# Patient Record
Sex: MALE | Race: WHITE | URBAN - METROPOLITAN AREA
[De-identification: names, ages, dates, MRNs, and addresses within clinical notes are randomized per-mention and may not be internally consistent; named-entity substitution may affect disease eponyms.]

---

## 2017-10-10 ENCOUNTER — EMERGENCY (EMERGENCY)
Facility: HOSPITAL | Age: 22
LOS: 0 days | Discharge: HOME | End: 2017-10-10
Admitting: INTERNAL MEDICINE

## 2017-10-10 DIAGNOSIS — Z88.8 ALLERGY STATUS TO OTHER DRUGS, MEDICAMENTS AND BIOLOGICAL SUBSTANCES: ICD-10-CM

## 2017-10-10 DIAGNOSIS — Z87.891 PERSONAL HISTORY OF NICOTINE DEPENDENCE: ICD-10-CM
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
## 2017-10-10 DIAGNOSIS — S39.012A STRAIN OF MUSCLE, FASCIA AND TENDON OF LOWER BACK, INITIAL ENCOUNTER: ICD-10-CM
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
## 2017-10-10 DIAGNOSIS — M54.5 LOW BACK PAIN: ICD-10-CM
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
## 2017-12-13 ENCOUNTER — EMERGENCY (EMERGENCY)
Facility: HOSPITAL | Age: 22
LOS: 0 days | Discharge: HOME | End: 2017-12-13
Admitting: INTERNAL MEDICINE

## 2017-12-13 DIAGNOSIS — M54.5 LOW BACK PAIN: ICD-10-CM
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
## 2017-12-13 DIAGNOSIS — S39.012A STRAIN OF MUSCLE, FASCIA AND TENDON OF LOWER BACK, INITIAL ENCOUNTER: ICD-10-CM
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
## 2020-03-03 ENCOUNTER — EMERGENCY (EMERGENCY)
Facility: HOSPITAL | Age: 25
LOS: 0 days | Discharge: HOME | End: 2020-03-03
Admitting: EMERGENCY MEDICINE
Payer: MEDICAID

## 2020-03-03 VITALS
RESPIRATION RATE: 16 BRPM | DIASTOLIC BLOOD PRESSURE: 94 MMHG | SYSTOLIC BLOOD PRESSURE: 142 MMHG | OXYGEN SATURATION: 99 % | TEMPERATURE: 98 F | WEIGHT: 154.1 LBS | HEART RATE: 93 BPM

## 2020-03-03 DIAGNOSIS — L02.91 CUTANEOUS ABSCESS, UNSPECIFIED: ICD-10-CM

## 2020-03-03 DIAGNOSIS — L02.211 CUTANEOUS ABSCESS OF ABDOMINAL WALL: ICD-10-CM

## 2020-03-03 PROCEDURE — 99283 EMERGENCY DEPT VISIT LOW MDM: CPT | Mod: 25

## 2020-03-03 PROCEDURE — 10060 I&D ABSCESS SIMPLE/SINGLE: CPT

## 2020-03-03 NOTE — ED PROVIDER NOTE - PHYSICAL EXAMINATION
CONST: Well appearing in NAD  EYES: PERRL, EOMI, Sclera and conjunctiva clear.   NECK: Non-tender, no meningeal signs  CARD: Normal S1 S2; Normal rate and rhythm  RESP: Equal BS B/L, No wheezes, rhonchi or rales. No distress  GI: Soft, non-tender, non-distended.  MS: Normal ROM in all extremities. No midline spinal tenderness.  SKIN: Warm, dry, no acute rashes. Good turgor. Small 2.5cm tender red fluctuant abscess to RLQ abd wall. No cellulitis.   NEURO: A&Ox3, No focal deficits. Strength 5/5 with no sensory deficits. Steady gait

## 2020-03-03 NOTE — ED PROVIDER NOTE - CLINICAL SUMMARY MEDICAL DECISION MAKING FREE TEXT BOX
pt with abd wall abscess. No surrounding cellulitis. No fever or chills. I+D done. Drained. No packing placed. PO abx given. FU with PMD or return if worsen

## 2020-03-03 NOTE — ED PROCEDURE NOTE - CPROC ED POST PROC CARE GUIDE1
Keep the cast/splint/dressing clean and dry./Instructed patient/caregiver to follow-up with primary care physician./Instructed patient/caregiver regarding signs and symptoms of infection.

## 2020-03-03 NOTE — ED PROVIDER NOTE - OBJECTIVE STATEMENT
Pt with connstant mild sharp pain to right lower abdominal wall x 3 days. Worse on palpation No fever or chills Pt with constant mild sharp pain to right lower abdominal wall x 3 days. Worse on palpation No fever or chills

## 2020-07-13 ENCOUNTER — EMERGENCY (EMERGENCY)
Facility: HOSPITAL | Age: 25
LOS: 0 days | Discharge: HOME | End: 2020-07-13
Attending: EMERGENCY MEDICINE | Admitting: EMERGENCY MEDICINE
Payer: MEDICAID

## 2020-07-13 VITALS
HEART RATE: 86 BPM | OXYGEN SATURATION: 98 % | TEMPERATURE: 98 F | DIASTOLIC BLOOD PRESSURE: 87 MMHG | WEIGHT: 160.06 LBS | RESPIRATION RATE: 18 BRPM | SYSTOLIC BLOOD PRESSURE: 138 MMHG | HEIGHT: 76 IN

## 2020-07-13 DIAGNOSIS — Z88.8 ALLERGY STATUS TO OTHER DRUGS, MEDICAMENTS AND BIOLOGICAL SUBSTANCES: ICD-10-CM

## 2020-07-13 DIAGNOSIS — R07.9 CHEST PAIN, UNSPECIFIED: ICD-10-CM

## 2020-07-13 DIAGNOSIS — R07.81 PLEURODYNIA: ICD-10-CM

## 2020-07-13 PROCEDURE — 99284 EMERGENCY DEPT VISIT MOD MDM: CPT

## 2020-07-13 PROCEDURE — 93010 ELECTROCARDIOGRAM REPORT: CPT

## 2020-07-13 PROCEDURE — 71046 X-RAY EXAM CHEST 2 VIEWS: CPT | Mod: 26

## 2020-07-13 RX ORDER — KETOROLAC TROMETHAMINE 30 MG/ML
30 SYRINGE (ML) INJECTION ONCE
Refills: 0 | Status: DISCONTINUED | OUTPATIENT
Start: 2020-07-13 | End: 2020-07-13

## 2020-07-13 RX ORDER — IBUPROFEN 200 MG
1 TABLET ORAL
Qty: 40 | Refills: 0
Start: 2020-07-13 | End: 2020-07-22

## 2020-07-13 RX ADMIN — Medication 30 MILLIGRAM(S): at 22:38

## 2020-07-13 NOTE — ED ADULT NURSE NOTE - OBJECTIVE STATEMENT
Pt awake and alert, anxious. Presents to ED with left midsternal intermittent since 2 pm that is sharp and radiates to left upper back worse when laying down. Pt states that he was working when cp first began. Denies any dizziness/ lightheadedness, n/v, cough, palps or sob. Breathing regular and unlabored. HR 80's and regular. Skin warm and dry. Will continue to monitor.

## 2020-07-13 NOTE — ED PROVIDER NOTE - CARE PROVIDER_API CALL
Jason Orellana [facility]  [address] [place] [address]  Phone: [phone]  Fax: [phone]  Follow Up Time:

## 2020-07-13 NOTE — ED PROVIDER NOTE - CLINICAL SUMMARY MEDICAL DECISION MAKING FREE TEXT BOX
Healthy 25 yo M, here for assessment of chest pain -- pain is L sided, sharp, worse with movement and laying back. No associated sx. 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 No FH of CAD or sudden cardiac death at a young age, no recent illness, immobilization, history of PE or DVT.   VS normal, on exam patient looks well, has clear lungs, RRR, soft, NT, ND abdomen, non tender chest, no calf swelling, redness, tenderness, no pitting edema, equal radial pulses.  EKG non ischemic, CXR without infiltrate, PTX. Patient is PERC negative.  After toradol sx improving.   Patient is very low risk for cardiac ischemia and sx as described not suggestive of ischemic CP. Has no infiltrate to suggestive PNA, no PTX on XR. Given he is PERC negative, no indication for d dimer or CTA to further rule out PE. Advised patient to follow up with both cardiology and PMD, return for any new or worsening sx. NSAIDs TID with food for 5 days for pleuritic CP.

## 2020-07-13 NOTE — ED PROVIDER NOTE - OBJECTIVE STATEMENT
24 year old male with no pmhx presents with chest wall pain since this afternoon. Pain to left anterior chest wall, occurred after cleaning a pool at work. pt denies sob, cough, fever, chills, calf pain or swelling, recent travel or sick contacts. no sore throat, or nasal congestion. no drugs or etoh use. no fh of cardiac disease .,

## 2020-07-13 NOTE — ED PROVIDER NOTE - PATIENT PORTAL LINK FT
You can access the FollowMyHealth Patient Portal offered by [facility] by registering at the following website: [email or web address]. By joining [employer]’s FollowMyHealth portal, you will also be able to view your health information using other applications (apps) compatible with our system.

## 2020-07-13 NOTE — ED ADULT TRIAGE NOTE - WEIGHT IN LBS
I have prescribed you the antibiotic Amoxicillin. Take this medication as directed and complete the entire course, even if you're feeling better. If you miss a dose, take it as soon as possible. If you are on birth control pills make sure you use additional back up protection during your course of amoxicillin until your next menstrual period. Common side effects of this medication include diarrhea and rash. If you develop a rash while taking this medication please stop taking this medication and call your health care provider immediately. Notify your provider if symptoms do not improve one week after completing the course of this antibiotic. For your sore throat:  Zinc may reduce the severity of cold symptoms and could even shorten your illness. The newest zinc lozenges come formulated with herbs like peppermint and licorice to help soothe your throat and relieve chest congestion. Try Cold-Eeze Cold Remedy Plus Multi Symptom Relief Quick Melts ($12 for 24 lozenges). For your runny nose:  Try moistened with natural saline wipes. They are much more gentle than dry tissues. Try Puffs Fresh Faces nose wipes with Vicks ($5 for 45 wipes)    For your congestion:  Since the common cold is caused by a virus, the best medication is rest. But antihistamine-decongestant combo medications are a good bet for easing your symptoms such as post nasal drip and congestion. Try Claritin D 12 hour ($14 for 10 tablets).
160

## 2021-01-05 PROBLEM — Z00.00 ENCOUNTER FOR PREVENTIVE HEALTH EXAMINATION: Status: ACTIVE | Noted: 2021-01-05

## 2021-01-23 ENCOUNTER — TRANSCRIPTION ENCOUNTER (OUTPATIENT)
Age: 26
End: 2021-01-23

## 2021-01-28 ENCOUNTER — EMERGENCY (EMERGENCY)
Facility: HOSPITAL | Age: 26
LOS: 0 days | Discharge: HOME | End: 2021-01-28
Attending: STUDENT IN AN ORGANIZED HEALTH CARE EDUCATION/TRAINING PROGRAM
Payer: MEDICAID

## 2021-01-28 VITALS
SYSTOLIC BLOOD PRESSURE: 132 MMHG | HEART RATE: 72 BPM | DIASTOLIC BLOOD PRESSURE: 83 MMHG | TEMPERATURE: 98 F | HEIGHT: 76 IN | RESPIRATION RATE: 18 BRPM | OXYGEN SATURATION: 98 %

## 2021-01-28 DIAGNOSIS — F17.200 NICOTINE DEPENDENCE, UNSPECIFIED, UNCOMPLICATED: ICD-10-CM

## 2021-01-28 DIAGNOSIS — R10.9 UNSPECIFIED ABDOMINAL PAIN: ICD-10-CM

## 2021-01-28 DIAGNOSIS — Z88.8 ALLERGY STATUS TO OTHER DRUGS, MEDICAMENTS AND BIOLOGICAL SUBSTANCES STATUS: ICD-10-CM

## 2021-01-28 DIAGNOSIS — N20.0 CALCULUS OF KIDNEY: ICD-10-CM

## 2021-01-28 LAB
ALBUMIN SERPL ELPH-MCNC: 4.9 G/DL — SIGNIFICANT CHANGE UP (ref 3.5–5.2)
ALP SERPL-CCNC: 83 U/L — SIGNIFICANT CHANGE UP (ref 30–115)
ALT FLD-CCNC: 22 U/L — SIGNIFICANT CHANGE UP (ref 0–41)
ANION GAP SERPL CALC-SCNC: 10 MMOL/L — SIGNIFICANT CHANGE UP (ref 7–14)
APPEARANCE UR: CLEAR — SIGNIFICANT CHANGE UP
AST SERPL-CCNC: 17 U/L — SIGNIFICANT CHANGE UP (ref 0–41)
BASOPHILS # BLD AUTO: 0.05 K/UL — SIGNIFICANT CHANGE UP (ref 0–0.2)
BASOPHILS NFR BLD AUTO: 0.8 % — SIGNIFICANT CHANGE UP (ref 0–1)
BILIRUB SERPL-MCNC: 0.3 MG/DL — SIGNIFICANT CHANGE UP (ref 0.2–1.2)
BILIRUB UR-MCNC: NEGATIVE — SIGNIFICANT CHANGE UP
BUN SERPL-MCNC: 15 MG/DL — SIGNIFICANT CHANGE UP (ref 10–20)
CALCIUM SERPL-MCNC: 9.8 MG/DL — SIGNIFICANT CHANGE UP (ref 8.5–10.1)
CHLORIDE SERPL-SCNC: 102 MMOL/L — SIGNIFICANT CHANGE UP (ref 98–110)
CO2 SERPL-SCNC: 27 MMOL/L — SIGNIFICANT CHANGE UP (ref 17–32)
COLOR SPEC: SIGNIFICANT CHANGE UP
CREAT SERPL-MCNC: 0.9 MG/DL — SIGNIFICANT CHANGE UP (ref 0.7–1.5)
DIFF PNL FLD: SIGNIFICANT CHANGE UP
EOSINOPHIL # BLD AUTO: 0.11 K/UL — SIGNIFICANT CHANGE UP (ref 0–0.7)
EOSINOPHIL NFR BLD AUTO: 1.7 % — SIGNIFICANT CHANGE UP (ref 0–8)
GLUCOSE SERPL-MCNC: 109 MG/DL — HIGH (ref 70–99)
GLUCOSE UR QL: NEGATIVE — SIGNIFICANT CHANGE UP
HCT VFR BLD CALC: 43.3 % — SIGNIFICANT CHANGE UP (ref 42–52)
HGB BLD-MCNC: 14.4 G/DL — SIGNIFICANT CHANGE UP (ref 14–18)
IMM GRANULOCYTES NFR BLD AUTO: 0.3 % — SIGNIFICANT CHANGE UP (ref 0.1–0.3)
KETONES UR-MCNC: NEGATIVE — SIGNIFICANT CHANGE UP
LACTATE SERPL-SCNC: 1.4 MMOL/L — SIGNIFICANT CHANGE UP (ref 0.7–2)
LEUKOCYTE ESTERASE UR-ACNC: NEGATIVE — SIGNIFICANT CHANGE UP
LIDOCAIN IGE QN: 18 U/L — SIGNIFICANT CHANGE UP (ref 7–60)
LYMPHOCYTES # BLD AUTO: 1.35 K/UL — SIGNIFICANT CHANGE UP (ref 1.2–3.4)
LYMPHOCYTES # BLD AUTO: 20.5 % — SIGNIFICANT CHANGE UP (ref 20.5–51.1)
MCHC RBC-ENTMCNC: 29.6 PG — SIGNIFICANT CHANGE UP (ref 27–31)
MCHC RBC-ENTMCNC: 33.3 G/DL — SIGNIFICANT CHANGE UP (ref 32–37)
MCV RBC AUTO: 88.9 FL — SIGNIFICANT CHANGE UP (ref 80–94)
MONOCYTES # BLD AUTO: 0.32 K/UL — SIGNIFICANT CHANGE UP (ref 0.1–0.6)
MONOCYTES NFR BLD AUTO: 4.9 % — SIGNIFICANT CHANGE UP (ref 1.7–9.3)
NEUTROPHILS # BLD AUTO: 4.72 K/UL — SIGNIFICANT CHANGE UP (ref 1.4–6.5)
NEUTROPHILS NFR BLD AUTO: 71.8 % — SIGNIFICANT CHANGE UP (ref 42.2–75.2)
NITRITE UR-MCNC: NEGATIVE — SIGNIFICANT CHANGE UP
NRBC # BLD: 0 /100 WBCS — SIGNIFICANT CHANGE UP (ref 0–0)
PH UR: 7.5 — SIGNIFICANT CHANGE UP (ref 5–8)
PLATELET # BLD AUTO: 237 K/UL — SIGNIFICANT CHANGE UP (ref 130–400)
POTASSIUM SERPL-MCNC: 4.7 MMOL/L — SIGNIFICANT CHANGE UP (ref 3.5–5)
POTASSIUM SERPL-SCNC: 4.7 MMOL/L — SIGNIFICANT CHANGE UP (ref 3.5–5)
PROT SERPL-MCNC: 7.1 G/DL — SIGNIFICANT CHANGE UP (ref 6–8)
PROT UR-MCNC: SIGNIFICANT CHANGE UP
RBC # BLD: 4.87 M/UL — SIGNIFICANT CHANGE UP (ref 4.7–6.1)
RBC # FLD: 12 % — SIGNIFICANT CHANGE UP (ref 11.5–14.5)
SODIUM SERPL-SCNC: 139 MMOL/L — SIGNIFICANT CHANGE UP (ref 135–146)
SP GR SPEC: 1.02 — SIGNIFICANT CHANGE UP (ref 1.01–1.03)
UROBILINOGEN FLD QL: SIGNIFICANT CHANGE UP
WBC # BLD: 6.57 K/UL — SIGNIFICANT CHANGE UP (ref 4.8–10.8)
WBC # FLD AUTO: 6.57 K/UL — SIGNIFICANT CHANGE UP (ref 4.8–10.8)

## 2021-01-28 PROCEDURE — 74177 CT ABD & PELVIS W/CONTRAST: CPT | Mod: 26

## 2021-01-28 PROCEDURE — 99285 EMERGENCY DEPT VISIT HI MDM: CPT

## 2021-01-28 RX ORDER — TAMSULOSIN HYDROCHLORIDE 0.4 MG/1
0.4 CAPSULE ORAL ONCE
Refills: 0 | Status: COMPLETED | OUTPATIENT
Start: 2021-01-28 | End: 2021-01-28

## 2021-01-28 RX ORDER — SODIUM CHLORIDE 9 MG/ML
1000 INJECTION, SOLUTION INTRAVENOUS ONCE
Refills: 0 | Status: COMPLETED | OUTPATIENT
Start: 2021-01-28 | End: 2021-01-28

## 2021-01-28 RX ORDER — TAMSULOSIN HYDROCHLORIDE 0.4 MG/1
1 CAPSULE ORAL
Qty: 14 | Refills: 0
Start: 2021-01-28 | End: 2021-02-10

## 2021-01-28 RX ORDER — KETOROLAC TROMETHAMINE 30 MG/ML
1 SYRINGE (ML) INJECTION
Qty: 20 | Refills: 0
Start: 2021-01-28 | End: 2021-02-01

## 2021-01-28 RX ORDER — KETOROLAC TROMETHAMINE 30 MG/ML
15 SYRINGE (ML) INJECTION ONCE
Refills: 0 | Status: DISCONTINUED | OUTPATIENT
Start: 2021-01-28 | End: 2021-01-28

## 2021-01-28 RX ADMIN — Medication 15 MILLIGRAM(S): at 14:29

## 2021-01-28 RX ADMIN — TAMSULOSIN HYDROCHLORIDE 0.4 MILLIGRAM(S): 0.4 CAPSULE ORAL at 14:29

## 2021-01-28 RX ADMIN — SODIUM CHLORIDE 1000 MILLILITER(S): 9 INJECTION, SOLUTION INTRAVENOUS at 10:36

## 2021-01-28 RX ADMIN — SODIUM CHLORIDE 1000 MILLILITER(S): 9 INJECTION, SOLUTION INTRAVENOUS at 11:21

## 2021-01-28 NOTE — ED PROVIDER NOTE - ATTENDING CONTRIBUTION TO CARE
25 yr old m w/ no pmh who presents today with RLQ abdominal pain. Pt reports that today, he had severe RLQ abdominal pain, and had an episode of emesis. Pt denies any fevers, chills, diarrhea or any other complaints.   Of note, pt is recently being treated for prostatitis w/ cipro and flagyl. Pt started taking medications yesterday.     VITAL SIGNS: I have reviewed nursing notes and confirm.  CONSTITUTIONAL: non-toxic, well appearing  SKIN: no rash, no petechiae.  EYES: PERRL, EOMI, pink conjunctiva, anicteric  ENT: tongue midline, no exudates, MMM  NECK: Supple; no meningismus, no JVD  CARD: RRR, no murmurs, equal radial pulses bilaterally 2+  RESP: CTAB, no respiratory distress  ABD: Soft, non-tender, non-distended, no peritoneal signs, no HSM, no CVA tenderness  : no hernias, no lesions, no testicular swelling/tenderness  EXT: Normal ROM x4. No edema. No calves tenderness  NEURO: Alert, oriented. CN2-12 intact, equal strength bilaterally, nl gait.  PSYCH: Cooperative, appropriate.    a/p  25 yr old m that presents with abdominal pain   -labs  -imaging  -ua  -dispo as per above

## 2021-01-28 NOTE — ED PROVIDER NOTE - CLINICAL SUMMARY MEDICAL DECISION MAKING FREE TEXT BOX
25 yr old m that presents with RLQ abdominal pain. labs, ua, imaging obtained. pt given IVF, medications given. pt informed of kidney stone. pt discharged with urology follow up and strict return precautions.

## 2021-01-28 NOTE — ED PROVIDER NOTE - CARE PROVIDER_API CALL
Adam Anne J  GASTROENTEROLOGY  [address] [place] [id number]  Phone: [phone]  Fax: [phone]  Follow Up Time: Routine   Jules Snell)  Urology  [address] [place] [address], [address]  [place] [address]  Phone: [phone]  Fax: [phone]  Follow Up Time: 1-3 Days

## 2021-01-28 NOTE — ED ADULT NURSE NOTE - COVID-19 ORDERING FACILITY
[facility]  [facility]

## 2021-01-28 NOTE — ED PROVIDER NOTE - PATIENT PORTAL LINK FT
You can access the FollowMyHealth Patient Portal offered by [facility] by registering at the following website: [email or web address]. By joining [employer]’s FollowMyHealth portal, you will also be able to view your health information using other applications (apps) compatible with our system.

## 2021-01-28 NOTE — ED PROVIDER NOTE - CARE PROVIDERS DIRECT ADDRESSES
,DirectAddress_Unknown ,[email or web address]

## 2021-01-28 NOTE — ED ADULT TRIAGE NOTE - CHIEF COMPLAINT QUOTE
As per patient recently treated in urgent care for urinary retention , started on Cipro Flagyl and comes in today c/o LLQ pain and urinary retention

## 2021-01-28 NOTE — ED PROVIDER NOTE - NS ED ROS FT
Constitutional:  see HPI  Head:  no headache, dizziness, loss of consciousness  Eyes:  no visual changes; no eye pain, redness, or discharge  ENMT:  no ear pain or discharge; no hearing problems; no mouth or throat sores or lesions; no throat pain  Cardiac: no chest pain, tachycardia or palpitations  Respiratory: no cough, wheezing, shortness of breath, chest tightness, or trouble breathing  GI: abd pain, n/v/d  :  no dysuria, frequency, or burning with urination; no change in urine output  MS: no myalgias, muscle weakness, joint pain,or  injury; no joint swelling  Neuro: no weakness; no numbness or tingling; no seizure  Skin:  no rashes or color changes; no lacerations or abrasions

## 2021-01-28 NOTE — ED PROVIDER NOTE - NSFOLLOWUPINSTRUCTIONS_ED_ALL_ED_FT
Abdominal Pain    Many things can cause abdominal pain. Usually, abdominal pain is not caused by a disease and will improve without treatment. Your health care provider will do a physical exam and possibly order blood tests and imaging to help determine the seriousness of your pain. However, in many cases, no cause may be found and you may need further testing as an outpatient. Monitor your abdominal pain for any changes.     SEEK IMMEDIATE MEDICAL CARE IF YOU HAVE THE FOLLOWING SYMPTOMS: worsening abdominal pain, vomiting, diarrhea, inability to have bowel movements or pass gas, black or bloody stool, fever accompanying chest pain or back pain, or dizziness/lightheadedness. Kidney Stones    Kidney stones (urolithiasis) are deposits that form inside your kidneys. The intense pain is caused by the stone moving through the urinary tract. When the stone moves, the ureter goes into spasm around the stone. The stone is usually passed in the urine. Symptoms include abdominal, side, or back pain, nausea, vomiting, blood in the urine, frequency with urination. Drink enough water and fluids to keep your urine clear or pale yellow. This will help you to pass the stone or stone fragments.    SEEK IMMEDIATE MEDICAL CARE IF YOU HAVE THE FOLLOWING SYMPTOMS: pain not controlled with medication, fever/chills, worsening vomiting, inability to urinate, or dizziness/lightheadedness.

## 2021-01-28 NOTE — ED PROVIDER NOTE - OBJECTIVE STATEMENT
24 yo male denies pmhx presenting with urinary frequency yesterday and seen by urgent care, given cipro/flagyl for prostatitis, had nausea and vomiting later that night and RLQ pain that resolved this morning. denies fever, dysuria, diarrhea, penile discharge, hematuria.

## 2021-01-28 NOTE — ED PROVIDER NOTE - PHYSICAL EXAMINATION
CONSTITUTIONAL: Well-developed; well-nourished; in no acute distress.   SKIN: warm, dry  HEAD: Normocephalic; atraumatic.  EYES: PERRL, EOMI, normal sclera and conjunctiva   ENT: No nasal discharge; airway clear.  NECK: Supple; non tender.  CARD: S1, S2 normal; no murmurs, gallops, or rubs. Regular rate and rhythm.   RESP: No wheezes, rales or rhonchi.  ABD: soft mild TTP R flank  EXT: Normal ROM.  No clubbing, cyanosis or edema.   LYMPH: No acute cervical adenopathy.  NEURO: Alert, oriented, grossly unremarkable  PSYCH: Cooperative, appropriate.

## 2021-01-28 NOTE — ED PROVIDER NOTE - PROVIDER TOKENS
PROVIDER:[TOKEN:[[id number]],FOLLOWUP:[Routine]] PROVIDER:[TOKEN:[[id number]],FOLLOWUP:[1-3 Days]]

## 2021-01-30 LAB
CULTURE RESULTS: NO GROWTH — SIGNIFICANT CHANGE UP
SPECIMEN SOURCE: SIGNIFICANT CHANGE UP

## 2021-02-01 ENCOUNTER — APPOINTMENT (OUTPATIENT)
Dept: UROLOGY | Facility: CLINIC | Age: 26
End: 2021-02-01
Payer: MEDICAID

## 2021-02-01 DIAGNOSIS — R10.9 UNSPECIFIED ABDOMINAL PAIN: ICD-10-CM

## 2021-02-01 DIAGNOSIS — N13.30 UNSPECIFIED HYDRONEPHROSIS: ICD-10-CM

## 2021-02-01 DIAGNOSIS — N20.1 CALCULUS OF URETER: ICD-10-CM

## 2021-02-01 PROCEDURE — 99204 OFFICE O/P NEW MOD 45 MIN: CPT | Mod: 95

## 2021-02-01 NOTE — HISTORY OF PRESENT ILLNESS
[Home] : at home, [unfilled] , at the time of the visit. [Medical Office: ([facility])___] : at the medical office located in  [Verbal consent obtained from patient] : the patient, [unfilled] [FreeTextEntry1] : TELEMEDICINE - NEW PATIENT APPOINTMENT \par \par The patient-doctor relationship has been established in a face to face fashion via real time video/audio HIPAA compliant communication using telemedicine software. The patient's identity has been confirmed. The patient was previously emailed a copy of the telemedicine consent. They have had a chance to review and has now given verbal consent and has requested care to be assessed and treated through telemedicine and understands there maybe limitations in this process and they may need further follow up care in the office and or hospital settings.\par \par The patient denies fevers, chills, nausea and or vomiting and no unexplained weight loss.\par \par All pertinent parts of the patient PFSH (past medical, family and social histories), laboratory, radiological studies and physician notes were reviewed prior to starting the face to face portion of the telemedicine visit. Questionnaire results were discussed with patient.\par \par ================================================================================= \par \par This is a 25 year male who went to the ER on Jan 28th for right flank pain - 4mm stone seen in right ureter\par pain was 10 out of 10 \par currently 3 out of 10\par has not noticed a stone pass\par \par Jan 28 2021\par - UA - neg nitrates\par - creatinine - 0.9\par -WBC normal at 9\par \par 1/28/2021:\par independent interpretation-- images visualized by me - agree with urologist interpretation \par IMPRESSION:\par Right mild hydroureteronephrosis with a 0.4 x 0.3 x 0.3 cm right UVJ calculus.

## 2021-02-01 NOTE — ASSESSMENT
[FreeTextEntry1] : This is a 25 year male who went to the ER on Jan 28th for right flank pain - 4mm stone seen in right ureter\par pain was 10 out of 10 \par currently 3 out of 10\par has not noticed a stone pass\par \par Jan 28 2021\par - UA - neg nitrates\par - creatinine - 0.9\par -WBC normal at 9\par \par 1/28/2021:\par independent interpretation-- images visualized by me - agree with urologist interpretation \par IMPRESSION:\par Right mild hydroureteronephrosis with a 0.4 x 0.3 x 0.3 cm right UVJ calculus.

## 2021-02-05 ENCOUNTER — APPOINTMENT (OUTPATIENT)
Dept: OTOLARYNGOLOGY | Facility: CLINIC | Age: 26
End: 2021-02-05

## 2021-02-09 ENCOUNTER — LABORATORY RESULT (OUTPATIENT)
Age: 26
End: 2021-02-09

## 2021-02-12 ENCOUNTER — OUTPATIENT (OUTPATIENT)
Dept: OUTPATIENT SERVICES | Facility: HOSPITAL | Age: 26
LOS: 1 days | Discharge: HOME | End: 2021-02-12
Payer: MEDICAID

## 2021-02-12 ENCOUNTER — NON-APPOINTMENT (OUTPATIENT)
Age: 26
End: 2021-02-12

## 2021-02-12 DIAGNOSIS — N20.1 CALCULUS OF URETER: ICD-10-CM

## 2021-02-12 PROCEDURE — 76775 US EXAM ABDO BACK WALL LIM: CPT | Mod: 26

## 2021-02-12 PROCEDURE — 74019 RADEX ABDOMEN 2 VIEWS: CPT | Mod: 26

## 2021-02-22 ENCOUNTER — NON-APPOINTMENT (OUTPATIENT)
Age: 26
End: 2021-02-22

## 2021-03-02 ENCOUNTER — EMERGENCY (EMERGENCY)
Facility: HOSPITAL | Age: 26
LOS: 0 days | Discharge: HOME | End: 2021-03-02
Attending: EMERGENCY MEDICINE | Admitting: EMERGENCY MEDICINE
Payer: MEDICAID

## 2021-03-02 VITALS
WEIGHT: 160.06 LBS | TEMPERATURE: 97 F | SYSTOLIC BLOOD PRESSURE: 135 MMHG | HEIGHT: 76 IN | OXYGEN SATURATION: 100 % | RESPIRATION RATE: 20 BRPM | HEART RATE: 92 BPM | DIASTOLIC BLOOD PRESSURE: 96 MMHG

## 2021-03-02 DIAGNOSIS — Z88.8 ALLERGY STATUS TO OTHER DRUGS, MEDICAMENTS AND BIOLOGICAL SUBSTANCES STATUS: ICD-10-CM

## 2021-03-02 DIAGNOSIS — R07.9 CHEST PAIN, UNSPECIFIED: ICD-10-CM

## 2021-03-02 DIAGNOSIS — R07.89 OTHER CHEST PAIN: ICD-10-CM

## 2021-03-02 PROCEDURE — 93010 ELECTROCARDIOGRAM REPORT: CPT

## 2021-03-02 PROCEDURE — 71046 X-RAY EXAM CHEST 2 VIEWS: CPT | Mod: 26

## 2021-03-02 PROCEDURE — 99284 EMERGENCY DEPT VISIT MOD MDM: CPT

## 2021-03-02 NOTE — ED PROVIDER NOTE - CARE PROVIDER_API CALL
Bebeto An)  Cardiovascular Disease; Interventional Cardiology  [address] [place] [address]  Phone: [phone]  Fax: [phone]  Follow Up Time: 1-3 Days

## 2021-03-02 NOTE — ED PROVIDER NOTE - OBJECTIVE STATEMENT
24 yo male, no pmh, presents to ed for cp. started today, left sided, mild, aching, no radiation. denies fever, chills, sob, le swelling, abd pain, nvd.

## 2021-03-02 NOTE — ED PROVIDER NOTE - PATIENT PORTAL LINK FT
You can access the FollowMyHealth Patient Portal offered by [facility] by registering at the following website: [email or web address]. By joining [employer]’s FollowMyHealth portal, you will also be able to view your health information using other applications (apps) compatible with our system.

## 2021-03-02 NOTE — ED PROVIDER NOTE - ATTENDING CONTRIBUTION TO CARE
25yoM prev healthy, smoker, presents with L sided chest pain, nonradiating, nonexertional, constant though improving. 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 On ROS states sometimes has a strange feeling in his L leg though no actual pain. Denies SOB, cough, fever, leg swelling, recent long distance travel, and all others symptoms. On exam, afebrile, hemodynamically stable, saturating well, NAD, well appearing, laying comfortably in bed, no WOB, speaking full sentences, head NCAT, EOMI grossly, anicteric, MMM, no JVD, RRR, nml S1/S2, no m/r/g, lungs CTAB, no w/r/r, abd soft, NT, ND, nml BS, no rebound or guarding, AAO, CN's 3-12 grossly intact, COELLO spontaneously, no leg cyanosis or edema, 2+ symm radial pulses, skin warm, well perfused, no rashes or hives. Character low suspicion for PE and PERC negative and no e/o DVT on exam or on bedside US by sono team. Character low suspicion for ACS and ECG unchanged from prior. Also states in the past had identical CP and was told was 2/2 pleurisy and had f/u with cardio and Echo and was told his heart was strong without issues. Character low suspicion for dissection and no murmur or pulse asymmetry. No e/o PNA or PTX on exam or CXR. May be anxiety 2/2 today's event. Patient is well appearing, NAD, afebrile, hemodynamically stable. Any available tests and studies were discussed with patient. Discharged with instructions in further symptomatic care, return precautions, and need for PMD f/u.

## 2021-03-11 ENCOUNTER — TRANSCRIPTION ENCOUNTER (OUTPATIENT)
Age: 26
End: 2021-03-11

## 2021-03-23 ENCOUNTER — TRANSCRIPTION ENCOUNTER (OUTPATIENT)
Age: 26
End: 2021-03-23

## 2021-10-01 ENCOUNTER — APPOINTMENT (OUTPATIENT)
Dept: NEUROLOGY | Facility: CLINIC | Age: 26
End: 2021-10-01

## 2021-10-05 ENCOUNTER — EMERGENCY (EMERGENCY)
Facility: HOSPITAL | Age: 26
LOS: 0 days | Discharge: HOME | End: 2021-10-05
Attending: EMERGENCY MEDICINE | Admitting: EMERGENCY MEDICINE
Payer: MEDICAID

## 2021-10-05 VITALS
RESPIRATION RATE: 18 BRPM | HEIGHT: 76 IN | TEMPERATURE: 99 F | WEIGHT: 164.91 LBS | DIASTOLIC BLOOD PRESSURE: 94 MMHG | SYSTOLIC BLOOD PRESSURE: 145 MMHG | OXYGEN SATURATION: 100 % | HEART RATE: 88 BPM

## 2021-10-05 DIAGNOSIS — S02.5XXA FRACTURE OF TOOTH (TRAUMATIC), INITIAL ENCOUNTER FOR CLOSED FRACTURE: ICD-10-CM

## 2021-10-05 DIAGNOSIS — F41.9 ANXIETY DISORDER, UNSPECIFIED: ICD-10-CM

## 2021-10-05 DIAGNOSIS — F17.200 NICOTINE DEPENDENCE, UNSPECIFIED, UNCOMPLICATED: ICD-10-CM

## 2021-10-05 DIAGNOSIS — R68.84 JAW PAIN: ICD-10-CM

## 2021-10-05 DIAGNOSIS — Z88.8 ALLERGY STATUS TO OTHER DRUGS, MEDICAMENTS AND BIOLOGICAL SUBSTANCES STATUS: ICD-10-CM
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
## 2021-10-05 PROCEDURE — 99284 EMERGENCY DEPT VISIT MOD MDM: CPT

## 2021-10-05 RX ORDER — AMOXICILLIN 250 MG/5ML
1 SUSPENSION, RECONSTITUTED, ORAL (ML) ORAL
Qty: 21 | Refills: 0
Start: 2021-10-05 | End: 2021-10-11

## 2021-10-05 NOTE — ED PROVIDER NOTE - PHYSICAL EXAMINATION
PHYSICAL EXAM:    GENERAL: Alert, appears stated age, well appearing, non-toxic  SKIN: Warm, pink and dry. MMM.   HEAD: NC, AT, no step offs   ENT: Poor dental hygiene noticed with cracked tooth noticed on tooth #14 with no discharge, no swelling, or no abscess noticed; nontender to palpation. No abscess or swelling underneath the tongue. Mandibular area nontender to palpation with full ROM. Normal hearing, patent oropharynx without erythema or exudate, TMs clear b/l. uvula midline  NECK: +supple, no tenderness, meningismus, or JVD, +Trachea midline  Pulm: Bilateral BS, normal resp effort, no wheezes, stridor, or retractions  CV: RRR, no M/R/G, 2+ pulses throughout  Abd: soft, non-tender, non-distended, no hepatosplenomegaly  Mskel: no edema, erythema, cyanosis

## 2021-10-05 NOTE — ED PROVIDER NOTE - OBJECTIVE STATEMENT
Pt is 25 y/o male with PMH of anxiety who presents with L lower jaw pain. Reports that he cracked his left upper tooth while he was chewing 2 weeks ago. About 1 week ago, started noticing "soreness" on his left lower jaw. Reports that he has been really concerned that the jaw soreness is related to his cracked tooth, so he came in today to get checked up. Reports that jaw soreness has been intermittent and does not follow a specific pattern. Last symptom was this morning and eventually subsided after taking motrin; currently denies any pain. Denies fever, chills, SOB, chest pain, N/V. Reports that he has not been taking a care of his teeth.

## 2021-10-05 NOTE — ED PROVIDER NOTE - NSFOLLOWUPCLINICS_GEN_ALL_ED_FT
[facility]  Dental  [address] [place] [address]  Phone: [phone]  Fax:

## 2021-10-05 NOTE — ED ADULT NURSE NOTE - PAIN RATING/NUMBER SCALE (0-10): ACTIVITY
5
H/O arthroscopy of knee  RT AND LT  H/O foot surgery    History of hernia repair    History of lumbar surgery  spinal stenosis

## 2021-10-05 NOTE — ED PROVIDER NOTE - NS ED ROS FT
CONST: No fever, chills or bodyaches  EYES: No pain, redness, drainage or visual changes.  ENT: + left jaw pain. No ear pain or discharge, nasal discharge or congestion. No sore throat  CARD: No chest pain, palpitations  RESP: No SOB, cough, hemoptysis. No hx of asthma or COPD  GI: No abdominal pain, N/V/D  MS: No joint pain, back pain or extremity pain/injury  SKIN: No rashes  NEURO: No headache, dizziness, paresthesias or LOC

## 2021-10-05 NOTE — ED PROVIDER NOTE - CLINICAL SUMMARY MEDICAL DECISION MAKING FREE TEXT BOX
Rec Tylenol or Motrin as needed for pain, Will start po abx,  Pt counseled on smoking cessation.  Instructed importance of dental follow up. Pt instructed to return if any worsening symptoms or concerns.  They verbalize understanding.

## 2021-10-05 NOTE — ED PROVIDER NOTE - CARE PROVIDER_API CALL
Delphine Fields (DDS)  [place] [address]  Phone: [phone]  Fax: [phone]  Follow Up Time:

## 2021-10-05 NOTE — ED PROVIDER NOTE - ATTENDING CONTRIBUTION TO CARE
25 yo M presents with left sided jaw pain on and off x few days.  A few weeks ago pt cracked a tooth on same side.  no fever or chills, Feels improved with Motrin.  On exam pt in NAD AAO x 3, + poor dentition, + multiple dental caries, + cracked # 14 with decay, no gum swelling, no lad, no sublingual swelling, neck supple, no lad

## 2021-10-05 NOTE — ED PROVIDER NOTE - PATIENT PORTAL LINK FT
You can access the FollowMyHealth Patient Portal offered by [facility] by registering at the following website: [email or web address]. By joining [employer]’s FollowMyHealth portal, you will also be able to view your health information using other applications (apps) compatible with our system.

## 2021-11-01 NOTE — ED ADULT NURSE NOTE - PAIN: RADIATION
Subjective:   Marlen Grossman is 2 weeks s/p total thyroidectomy. She is doing well. Her pain is resolving as expected.  She feels well.  No complaints.  Minimal output from LI drain.    Pathologic Diagnosis   Total thyroid; thyroidectomy:   -Multinodular goiter; negative for malignancy.      Electronically signed by Chepe Dial MD on 10/25/2021 at 1124         Objective:   Visit Vitals  /82 (BP Location: LUE - Left upper extremity, Patient Position: Sitting, Cuff Size: Regular)   Pulse 77   Temp 98 °F (36.7 °C) (Oral)   Resp 18   Ht 5' 4\" (1.626 m)   Wt 114.8 kg (253 lb)   LMP 01/01/2018   SpO2 98%   BMI 43.43 kg/m²        Physical Exam  Patient appears well.  Incision looks good.  Steri-Strips were placed as she did have a small serous leak after LI removed.    Assessment and Plan:   Problem List Items Addressed This Visit     None      Visit Diagnoses     S/P total thyroidectomy    -  Primary          She is recovering well from surgery. Continue levothyroxine.  Follow up with endocrinology and your primary as per routine.  Start neck stretching and massage to the scar.  Start Mederma.  Start silicone bandages at night.  Follow-up with me as needed.           May return to clinic as needed.    Patel Sanchez MD       abdomen

## 2021-11-21 ENCOUNTER — TRANSCRIPTION ENCOUNTER (OUTPATIENT)
Age: 26
End: 2021-11-21

## 2021-12-15 NOTE — ED ADULT NURSE NOTE - CAS DISCH ACCOMP BY
93 % room air sat. Tolerated ice, then glass water well. BAL  Location: lingula  Input: 60 mL  Output: 7 mL  DISCARDED    BAL  Location: lingula   Input: 70 mL  Output: 30 mL    BAL  Location: CHERELLE   Input: 10 mL  Output: 2 mL Decreased o2 to 1l/min per simple mask. Discharge instructions reviewed and understanding verbalized per pt/family. While sitting in chair,94% RA saturation . Up to bathroom & voided qs & pivoted to chairs & into car & no c/o & sister aware to stay with patient today. Discharged home, stable condition, wheelchair to car without complications. Dr Wendy Robins in to see patient & her sister , Jorge @ bedside & spoke to them, stated resume elequis tonight & NPO x 2 hrs, until 2 PM & their office will notify them when they get biopies back in couple days & schedule f/u appointment, they verbalized understanding. PCXR done . Patient admitted to PACU with simple mask @ 6 l/min , patient denies pain or c/o , warmer added since chilled with relief. Pulse oximetry 199 %, change to room air, will monitor. Changed to Phase 2. Radiology read chest x ray \" no pneumothorax\". Room air saturation 89, so back to 2 l/min per simple mask, will monitor. Scope verified using 2 person system. Family in waiting room. Reviewed problem list, assessment, H&P, and checklist preoperatively. Sterile normal saline 10 ml flushed portacath,, then needle removed. Dressed & up in chair. Teaching / education initiated regarding perioperative experience, expectations, and pain management during stay. Patient verbalized understanding. stay or will be asked to wait in the car will depend on the current policy and if social distancing can be maintained. The policy is subject to change at any time. Please make sure the visitor has a cell phone that is on,charged and able to accept calls, as this may be the way that the staff communicates with them. Pain management is NO VISITOR policyThe patients ride is expected to remain in the car with a cell phone for communication. If the ride is leaving the hospital grounds please make sure they are back in time for pickup. Have the patient inform the staff on arrival what their rides plans are while the patient is in the facility. At the MAIN there is one visitor allowed. Please note that the visitor policy is subject to change. Self

## 2022-03-24 ENCOUNTER — TRANSCRIPTION ENCOUNTER (OUTPATIENT)
Age: 27
End: 2022-03-24

## 2022-12-16 ENCOUNTER — EMERGENCY (EMERGENCY)
Facility: HOSPITAL | Age: 27
LOS: 0 days | Discharge: HOME | End: 2022-12-16
Attending: EMERGENCY MEDICINE | Admitting: EMERGENCY MEDICINE

## 2022-12-16 VITALS
HEART RATE: 107 BPM | DIASTOLIC BLOOD PRESSURE: 95 MMHG | RESPIRATION RATE: 18 BRPM | OXYGEN SATURATION: 96 % | TEMPERATURE: 97 F | SYSTOLIC BLOOD PRESSURE: 121 MMHG

## 2022-12-16 DIAGNOSIS — F17.200 NICOTINE DEPENDENCE, UNSPECIFIED, UNCOMPLICATED: ICD-10-CM

## 2022-12-16 DIAGNOSIS — K03.81 CRACKED TOOTH: ICD-10-CM

## 2022-12-16 DIAGNOSIS — Z88.8 ALLERGY STATUS TO OTHER DRUGS, MEDICAMENTS AND BIOLOGICAL SUBSTANCES STATUS: ICD-10-CM

## 2022-12-16 DIAGNOSIS — K08.89 OTHER SPECIFIED DISORDERS OF TEETH AND SUPPORTING STRUCTURES: ICD-10-CM

## 2022-12-16 DIAGNOSIS — K02.9 DENTAL CARIES, UNSPECIFIED: ICD-10-CM

## 2022-12-16 PROCEDURE — 99284 EMERGENCY DEPT VISIT MOD MDM: CPT

## 2022-12-16 NOTE — ED PROVIDER NOTE - PHYSICAL EXAMINATION
Const: NAD  Eyes: PERRL, no conjunctival injection  HENT:  Neck supple without meningismus, Poor dentition with multiple dental caries.  Patient has swelling and cracked tooth of #31 and 32  CV: RRR, Warm, well-perfused extremities  RESP: CTA B/L, no tachypnea   MSK: No gross deformities appreciated  Skin: Warm, dry. No rashes  Neuro: Alert, CNs II-XII grossly intact. Sensation and motor function of extremities grossly intact.  Psych: Appropriate mood and affect.

## 2022-12-16 NOTE — CONSULT NOTE ADULT - ASSESSMENT
Patient is a 27y old  Male who presents with a chief complaint of lower right dental pain    HPI: 28-year-old male with no past medical history presents to the ED for tooth pain that started yesterday.  Patient thinks might be an abscess.  He went to a dentist in [place] earlier today and they told him this can cause $825 to get his tooth pulled which he did not have.  They given prescription for amoxicillin which she took 2 this morning as well as Motrin ibuprofen and came to the emergency department.  Pain is worse with chewing no fever no chills no drooling.      PAST MEDICAL & SURGICAL HISTORY:  No pertinent past medical history        ( - ) heart valve replacement  ( - ) joint replacement  ( - ) pregnancy    MEDICATIONS  (STANDING):    MEDICATIONS  (PRN):      Allergies    Ventolin (Other)    Intolerances        FAMILY HISTORY:      *SOCIAL HISTORY: (   ) Tobacco; (   ) ETOH    *Last Dental Visit:    Vital Signs Last 24 Hrs  T(C): 36.2 (16 Dec 2022 12:41), Max: 36.2 (16 Dec 2022 12:41)  T(F): 97.2 (16 Dec 2022 12:41), Max: 97.2 (16 Dec 2022 12:41)  HR: 107 (16 Dec 2022 12:41) (107 - 107)  BP: 121/95 (16 Dec 2022 12:41) (121/95 - 121/95)  BP(mean): --  RR: 18 (16 Dec 2022 12:41) (18 - 18)  SpO2: 96% (16 Dec 2022 12:41) (96% - 96%)    Parameters below as of 16 Dec 2022 12:41  Patient On (Oxygen Delivery Method): room air        LABS:                  EOE:  TMJ ( - ) clicks                     ( - ) pops                     ( - ) crepitus             Mandible <<FROM>>             Facial bones and MOM <<grossly intact>>             ( - ) trismus             ( - ) lymphadenopathy             ( - ) swelling             ( - ) asymmetry             ( - ) palpation             ( - ) dyspnea             ( - ) dysphagia             ( - ) loss of consciousness    IOE:  <<permanent>> dentition: <<multiple carious teeth>> and <<multiple missing teeth>>           hard/soft palate: <<No pathology noted>>           tongue/FOM <<No pathology noted>>           labial/buccal mucosa <<No pathology noted>>           ( + ) percussion           ( +) palpation           ( - ) swelling            ( - ) abscess           ( - ) sinus tract    Dentition present: <<y>>  Mobility: <<n>>  Caries: << y>>         *DENTAL RADIOGRAPHS: 1 Periapical    RADIOLOGY & ADDITIONAL STUDIES: Radiograph shows #31 decay down to root.     *ASSESSMENT: Nonrestorable tooth # 31       *PLAN: Nonsurgical extraction # 31    PROCEDURE:   Verbal and written consent given.  Anesthesia: 1 carpule of 2% Lidocaine (1:100,000 epinephrine) via right inferior alveolar nerve block; 3 carpule of 4% Septocaine (1:100,000 epinephrine) via local infiltration  Treatment: Treatment consequences explained as per OS sheet dated 7/13/00. Risks and benefits discussed. Consent obtained and side site completed. Administered anesthetic as described. Elevated and luxated tooth # 31  . Curettaged and irrigated site with saline. Hemostasis achieved. Post-operative radiographs taken. Post-operative instructions given. Patient will continue prescribed antibiotics and pain medications from private dentist.       RECOMMENDATIONS:  1) Complete antibiotic course as prescribed and analgesics as necessary for pain  2) Dental F/U with outpatient dentist for comprehensive dental care.   3) If any difficulty swallowing/breathing, fever occur, return to ER.     Resident Name: Crystal Singleton DDS [id number]

## 2022-12-16 NOTE — ED PROVIDER NOTE - NS ED ROS FT
Review of Systems   Constitutional:  No Weight Change, No Fever, No Chills, No weakness    ENT/Mouth:  No Nasal Congestion, No Hoarseness, No sore throat, No Rhinorrhea, No Swallowing Difficulty, + tooth pain  Cardiovascular:  No Chest Pain, Exertion, No Orthopnea  Respiratory:  No SOB, No Cough, No Wheezing  Musculoskeletal:  No Arthralgias, No Myalgias, No Joint Swelling, No Joint Stiffness,  Skin:  No rashes

## 2022-12-16 NOTE — ED PROVIDER NOTE - OBJECTIVE STATEMENT
28-year-old male with no past medical history presents to the ED for tooth pain that started yesterday.  Patient thinks might be an abscess.  He went to a dentist in [place] earlier today and they told him this can cause $825 to get his tooth pulled which he did not have.  They given prescription for amoxicillin which she took 2 this morning as well as Motrin ibuprofen and came to the emergency department.  Pain is worse with chewing no fever no chills no drooling.

## 2023-03-15 NOTE — ED ADULT TRIAGE NOTE - TEMPERATURE IN FAHRENHEIT (DEGREES F)
Yakelin Ramon was seen and treated in our emergency department on 3/15/2023. He may return to work on 03/16/2023. With restrictions: please allow light duty with no use of right arm until seen by orthopedic specialist     If you have any questions or concerns, please don't hesitate to call.       ANEL Rojas 99

## 2023-06-16 ENCOUNTER — EMERGENCY (EMERGENCY)
Facility: HOSPITAL | Age: 28
LOS: 0 days | Discharge: LEFT BEFORE DISCHARGE | End: 2023-06-16
Attending: STUDENT IN AN ORGANIZED HEALTH CARE EDUCATION/TRAINING PROGRAM
Payer: SELF-PAY

## 2023-06-16 VITALS
OXYGEN SATURATION: 99 % | DIASTOLIC BLOOD PRESSURE: 85 MMHG | WEIGHT: 169.98 LBS | SYSTOLIC BLOOD PRESSURE: 163 MMHG | RESPIRATION RATE: 18 BRPM | TEMPERATURE: 98 F | HEART RATE: 83 BPM

## 2023-06-16 DIAGNOSIS — Z53.21 PROCEDURE AND TREATMENT NOT CARRIED OUT DUE TO PATIENT LEAVING PRIOR TO BEING SEEN BY HEALTH CARE PROVIDER: ICD-10-CM

## 2023-06-16 DIAGNOSIS — K08.89 OTHER SPECIFIED DISORDERS OF TEETH AND SUPPORTING STRUCTURES: ICD-10-CM

## 2023-06-16 PROBLEM — Z78.9 OTHER SPECIFIED HEALTH STATUS: Chronic | Status: ACTIVE | Noted: 2022-12-16

## 2023-06-16 PROCEDURE — L9991: CPT

## 2023-06-16 NOTE — ED ADULT NURSE NOTE - NSFALLUNIVINTERV_ED_ALL_ED
Bed/Stretcher in lowest position, wheels locked, appropriate side rails in place/Call bell, personal items and telephone in reach/Instruct patient to call for assistance before getting out of bed/chair/stretcher/Non-slip footwear applied when patient is off stretcher/[place] to call system/Physically safe environment - no spills, clutter or unnecessary equipment/Purposeful proactive rounding/Room/bathroom lighting operational, light cord in reach

## 2023-06-17 ENCOUNTER — EMERGENCY (EMERGENCY)
Facility: HOSPITAL | Age: 28
LOS: 0 days | Discharge: HOME | End: 2023-06-17
Attending: STUDENT IN AN ORGANIZED HEALTH CARE EDUCATION/TRAINING PROGRAM
Payer: SELF-PAY

## 2023-06-17 VITALS
SYSTOLIC BLOOD PRESSURE: 142 MMHG | HEIGHT: 74 IN | TEMPERATURE: 98 F | WEIGHT: 169.98 LBS | DIASTOLIC BLOOD PRESSURE: 92 MMHG | HEART RATE: 89 BPM | RESPIRATION RATE: 20 BRPM | OXYGEN SATURATION: 100 %

## 2023-06-17 DIAGNOSIS — R22.0 LOCALIZED SWELLING, MASS AND LUMP, HEAD: ICD-10-CM

## 2023-06-17 DIAGNOSIS — K04.7 PERIAPICAL ABSCESS WITHOUT SINUS: ICD-10-CM

## 2023-06-17 DIAGNOSIS — K02.9 DENTAL CARIES, UNSPECIFIED: ICD-10-CM

## 2023-06-17 DIAGNOSIS — Z88.8 ALLERGY STATUS TO OTHER DRUGS, MEDICAMENTS AND BIOLOGICAL SUBSTANCES: ICD-10-CM

## 2023-06-17 DIAGNOSIS — K08.89 OTHER SPECIFIED DISORDERS OF TEETH AND SUPPORTING STRUCTURES: ICD-10-CM

## 2023-06-17 PROCEDURE — 41800 DRAINAGE OF GUM LESION: CPT

## 2023-06-17 PROCEDURE — 99284 EMERGENCY DEPT VISIT MOD MDM: CPT | Mod: 25

## 2023-06-17 PROCEDURE — 99284 EMERGENCY DEPT VISIT MOD MDM: CPT

## 2023-06-17 RX ORDER — ACETAMINOPHEN 500 MG
975 TABLET ORAL ONCE
Refills: 0 | Status: COMPLETED | OUTPATIENT
Start: 2023-06-17 | End: 2023-06-17

## 2023-06-17 RX ADMIN — Medication 975 MILLIGRAM(S): at 13:08

## 2023-06-17 NOTE — ED PROVIDER NOTE - PATIENT PORTAL LINK FT
You can access the FollowMyHealth Patient Portal offered by [facility] by registering at the following website: [email or web address]. By joining [employer]’s FollowMyHealth portal, you will also be able to view your health information using other applications (apps) compatible with our system.

## 2023-06-17 NOTE — ED PROVIDER NOTE - CLINICAL SUMMARY MEDICAL DECISION MAKING FREE TEXT BOX
.    Patient with dental infection.  Patient seen by dental, abscess drained without complication.  Patient received analgesia in the emergency department.  Patient safe for discharge with Rx for ABX, and dental follow-up.    .

## 2023-06-17 NOTE — ED ADULT NURSE NOTE - NSFALLUNIVINTERV_ED_ALL_ED
Bed/Stretcher in lowest position, wheels locked, appropriate side rails in place/Call bell, personal items and telephone in reach/Instruct patient to call for assistance before getting out of bed/chair/stretcher/Non-slip footwear applied when patient is off stretcher/[place] to call system/Physically safe environment - no spills, clutter or unnecessary equipment/Purposeful proactive rounding/Room/bathroom lighting operational, light cord in reach

## 2023-06-17 NOTE — ED PROVIDER NOTE - OBJECTIVE STATEMENT
28 y/o M  no sig pmh p/w L upper molar dentalgia x2d. + gingival pain and swelling. + facial swelling. No trauma, fever, neck pain, ha, SOB, drooling. Took OTC meds with good relief of pain. Took left over amox x 2 doses.

## 2023-06-17 NOTE — ED PROVIDER NOTE - PHYSICAL EXAMINATION
CONSTITUTIONAL: NAD  SKIN: Warm dry  HEAD: NCAT  EYES: NL inspection, EOMI  FACE: + L facial swelling w/o ttp or erythema  ENT: MMM; + L upper molar tooth decay; + ttp; + swelling and mild fluctuance to L upper gingival swelling; no drainage; no trismus  NECK: Supple; non tender.  RESP: NO resp distress  NEURO: Grossly unremarkable  PSYCH: Cooperative, appropriate.

## 2023-06-17 NOTE — ED PROVIDER NOTE - NS ED ATTENDING STATEMENT MOD
[place] - Labor & Delivery  Vaginal Delivery   [facility] - Labor & Delivery  Vaginal Delivery   Operative Note    DELIVERY NOTE:    After complete dilatation and +2 station  Patient pushes x 15 minutes , delivering    One viable infant sex   ( x )M / ( -) F    , in OA position   Umbilical cord was clamped and cut  , cord  blood sample was  collected   Uterine cavity manually revised for placenta.membranes and clots     Nuchal cord= NO (x)  YES (-)  Shoulder dystocia= NO   Apgar 9/9  Amniotic fluid= clear   Placenta= normal intact , complete   Umbilical Cord= 3 vessels     No cervical tears   Episiotomy =         Yes ( - )      NO  (  X)   Perineal tears =     Yes( x)    NO  ( - ) small first degree repaired CC 2-0  Vaginal tears=       Yes ( - )     NO  ( X)    EBL =  250cc  Sponge, lap, needle counts were correct   Vaginal sweep performed     No complications     Leonid Monroy M.D.   OB/GYN        Indications: <principal problem not specified>  Pregnancy complicated by:   Patient Active Problem List   Diagnosis    Abnormal chest CT    Cough    Mycobacterial infection, atypical    MELTON (dyspnea on exertion)    Mild intermittent reactive airway disease    Bronchiectasis    Anemia    Lung mass    Acute ITP    Coagulopathy    Thrombocytopenia    Leukocytosis    Mycobacterial infection    Pulmonary hypertension    Idiosyncratic reaction to medication after proper dose    History of renal failure    FATUMA (generalized anxiety disorder)    Allergic reaction    Rash    Chronic respiratory failure with hypoxia     (normal spontaneous vaginal delivery)    40 weeks gestation of pregnancy     Admitting GA: 40w1d    Delivery Information for Chevy Mccormick    Birth information:  YOB: 2023   Time of birth: 10:07 AM   Sex: male   Head Delivery Date/Time:     Delivery type:    Gestational Age: 40w1d    Delivery Providers    Delivering clinician:            Measurements    Weight:   Length:          Apgars     Living status:   Apgars:  1 min.:  5 min.:  10 min.:  15 min.:  20 min.:    Skin color:         Heart rate:         Reflex irritability:         Muscle tone:         Respiratory effort:         Total:                                  Interventions/Resuscitation           Cord    No data filed       Placenta    Placenta delivery date/time:   Placenta removal:            Labor Events:       labor:       Labor Onset Date/Time:         Dilation Complete Date/Time:         Start Pushing Date/Time:         Start Pushing Date/Time:       Rupture Date/Time: 22  0949         Rupture type:            Fluid Amount:         Fluid Color: Green        Fluid Odor:         Membrane Status: SRM (Spontaneous Rupture)                 steroids:       Antibiotics given for GBS:       Induction:       Indications for induction:        Augmentation:       Indications for augmentation:       Labor complications:       Additional complications:          Cervical ripening:                     Delivery:      Episiotomy:       Indication for Episiotomy:       Perineal Lacerations:   Repaired:      Periurethral Laceration:   Repaired:     Labial Laceration:   Repaired:     Sulcus Laceration:   Repaired:     Vaginal Laceration:   Repaired:     Cervical Laceration:   Repaired:     Repair suture:       Repair # of packets:       Last Value - EBL - Nursing (mL):       Sum - EBL - Nursing (mL): 0     Last Value - EBL - Anesthesia (mL):        Calculated QBL (mL):         Vaginal Sweep Performed:       Surgicount Correct:         Other providers:            Details (if applicable):  Trial of Labor      Categorization:      Priority:     Indications for :     Incision Type:       Additional  information:  Forceps:    Vacuum:    Breech:    Observed anomalies    Other (Comments):           Indications: <principal problem not specified>  Pregnancy complicated by:   Patient Active Problem List    Diagnosis    Abnormal chest CT    Cough    Mycobacterial infection, atypical    MELTON (dyspnea on exertion)    Mild intermittent reactive airway disease    Bronchiectasis    Anemia    Lung mass    Acute ITP    Coagulopathy    Thrombocytopenia    Leukocytosis    Mycobacterial infection    Pulmonary hypertension    Idiosyncratic reaction to medication after proper dose    History of renal failure    FATUMA (generalized anxiety disorder)    Allergic reaction    Rash    Chronic respiratory failure with hypoxia     (normal spontaneous vaginal delivery)    40 weeks gestation of pregnancy     Admitting GA: 40w1d    Delivery Information for Chevy Mccormick    Birth information:  YOB: 2023   Time of birth: 10:07 AM   Sex: male   Head Delivery Date/Time:     Delivery type:    Gestational Age: 40w1d    Delivery Providers    Delivering clinician:            Measurements    Weight:   Length:          Apgars    Living status:   Apgars:  1 min.:  5 min.:  10 min.:  15 min.:  20 min.:    Skin color:         Heart rate:         Reflex irritability:         Muscle tone:         Respiratory effort:         Total:                                  Interventions/Resuscitation           Cord    No data filed       Placenta    Placenta delivery date/time:   Placenta removal:            Labor Events:       labor:       Labor Onset Date/Time:         Dilation Complete Date/Time:         Start Pushing Date/Time:         Start Pushing Date/Time:       Rupture Date/Time: 22  0949         Rupture type:            Fluid Amount:         Fluid Color: Green        Fluid Odor:         Membrane Status: SRM (Spontaneous Rupture)                 steroids:       Antibiotics given for GBS:       Induction:       Indications for induction:        Augmentation:       Indications for augmentation:       Labor complications:       Additional complications:          Cervical ripening:                     Delivery:       Episiotomy:       Indication for Episiotomy:       Perineal Lacerations:   Repaired:      Periurethral Laceration:   Repaired:     Labial Laceration:   Repaired:     Sulcus Laceration:   Repaired:     Vaginal Laceration:   Repaired:     Cervical Laceration:   Repaired:     Repair suture:       Repair # of packets:       Last Value - EBL - Nursing (mL):       Sum - EBL - Nursing (mL): 0     Last Value - EBL - Anesthesia (mL):        Calculated QBL (mL):         Vaginal Sweep Performed:       Surgicount Correct:         Other providers:            Details (if applicable):  Trial of Labor      Categorization:      Priority:     Indications for :     Incision Type:       Additional  information:  Forceps:    Vacuum:    Breech:    Observed anomalies    Other (Comments):         Leonid Monroy M.D.   OB/GYN    2023     Attending Only

## 2023-06-17 NOTE — ED PROVIDER NOTE - NSFOLLOWUPINSTRUCTIONS_ED_ALL_ED_FT
You have been seen for dental pain and facial swelling.  Dental abscess was drained in the emergency department by the dental service.  You are safe for discharge.  Take antibiotics as prescribed.  Take over-the-counter medications like ibuprofen or Tylenol or both for pain.  Follow-up with your dentist, or in the dental clinic as discussed.  For new or worsening symptoms, return to the emergency department.

## 2023-06-17 NOTE — CONSULT NOTE ADULT - ASSESSMENT
Patient is a 27y old  Male who presents with a chief complaint of     HPI: Upper left swelling since last night/today morning around broken down #14      PAST MEDICAL & SURGICAL HISTORY:  No pertinent past medical history        MEDICATIONS  (STANDING):    MEDICATIONS  (PRN):      Allergies    Ventolin (Other)    Intolerances        FAMILY HISTORY:      Vital Signs Last 24 Hrs  T(C): 36.8 (2023 11:43), Max: 36.9 (2023 13:49)  T(F): 98.3 (2023 11:43), Max: 98.4 (2023 13:49)  HR: 89 (2023 11:43) (83 - 89)  BP: 142/92 (2023 11:43) (142/92 - 163/85)  BP(mean): --  RR: 20 (2023 11:43) (18 - 20)  SpO2: 100% (2023 11:43) (99% - 100%)    Parameters below as of 2023 11:43  Patient On (Oxygen Delivery Method): room air        LABS:                  EOE:  TMJ ( - ) clicks                     ( - ) pops                     ( - ) crepitus             Mandible <<FROM>>             Facial bones and MOM <<grossly intact>>             ( - ) trismus             ( - ) lymphadenopathy             ( + ) swelling             ( - ) asymmetry             ( - ) palpation             ( - ) dyspnea             ( - ) dysphagia             ( - ) loss of consciousness    IOE:  <<permanent>> dentition: <<multiple carious teeth>>            hard/soft palate: <<No pathology noted>>           tongue/FOM <<No pathology noted>>           labial/buccal mucosa <<No pathology noted>>           ( + ) percussion           ( - ) palpation           ( + ) swelling            ( +) abscess           ( - ) sinus tract    Dentition present: <<y>>  Mobility: <<n>>  Caries: << y>>         *DENTAL RADIOGRAPHS: None taken    RADIOLOGY & ADDITIONAL STUDIES:    *ASSESSMENT: Nonrestorable tooth #14 with buccal swelling      *PLAN: I and D. Administer anesthetic to alleviate symptoms. Continue analgesics as necessary for the pain.    PROCEDURE:   Verbal and written consent given.  Anesthesia: 2 carpules of 4% Septocaine (1:100,000 epinephrine) via buccal infiltration  Treatment: Limited Oral Examination. A linear incision along the buccal vestibule was made and the purulent material was expressed buccal #14. The abscess was explored thoroughly and sequestered pockets were opened. Irrigated with copious amounts of normal saline. Bleeding was minimal. The patient tolerated the procedure well without complications. Standard post-procedure care is explained and return precautions are given.    Patient previously took Amoxicillin of unidentified amount which was . Recommend Augmentin 875 mg q12h x 7 days    RECOMMENDATIONS:  1) Complete antibiotic course as prescribed and analgesics as necessary for pain  2) Dental F/U with outpatient dentist for comprehensive dental care or [facility] dental clinic   3) If any difficulty swallowing/breathing, fever occur, return to ER.     Resident Name: Eliud Payne, pager #[phone]

## 2023-06-17 NOTE — ED PROVIDER NOTE - PROGRESS NOTE DETAILS
Attestation Note:    I supervised Dental resident on key aspects of procedure and was available at any time during procedure.

## 2025-07-26 NOTE — ED PROVIDER NOTE - CARE PROVIDERS DIRECT ADDRESSES
Problem: Adult Inpatient Plan of Care  Goal: Plan of Care Review  Outcome: Progressing  Goal: Patient-Specific Goal (Individualized)  Outcome: Progressing  Goal: Absence of Hospital-Acquired Illness or Injury  Outcome: Progressing  Intervention: Identify and Manage Fall Risk  Recent Flowsheet Documentation  Taken 7/26/2025 1500 by Tara Wallace RN  Safety Promotion/Fall Prevention:   activity supervised   assistive device/personal items within reach   clutter free environment maintained   fall prevention program maintained   lighting adjusted   nonskid shoes/slippers when out of bed   room organization consistent   safety round/check completed  Taken 7/26/2025 1300 by Tara Wallace RN  Safety Promotion/Fall Prevention: safety round/check completed  Taken 7/26/2025 1100 by Tara Wallace RN  Safety Promotion/Fall Prevention:   activity supervised   assistive device/personal items within reach   clutter free environment maintained   fall prevention program maintained   lighting adjusted   nonskid shoes/slippers when out of bed   room organization consistent   safety round/check completed  Taken 7/26/2025 0900 by Tara Wallace RN  Safety Promotion/Fall Prevention:   activity supervised   assistive device/personal items within reach   clutter free environment maintained   fall prevention program maintained   lighting adjusted   nonskid shoes/slippers when out of bed   room organization consistent   safety round/check completed  Taken 7/26/2025 0700 by Tara Wallace RN  Safety Promotion/Fall Prevention:   activity supervised   assistive device/personal items within reach   clutter free environment maintained   fall prevention program maintained   lighting adjusted   nonskid shoes/slippers when out of bed   room organization consistent   safety round/check completed  Intervention: Prevent Skin Injury  Recent Flowsheet Documentation  Taken 7/26/2025 1400 by Tara Wallace RN  Skin Protection:   skin  sealant/moisture barrier applied   transparent dressing maintained  Taken 7/26/2025 0745 by Tara Wallace RN  Skin Protection:   transparent dressing maintained   skin sealant/moisture barrier applied   silicone foam dressing in place  Intervention: Prevent and Manage VTE (Venous Thromboembolism) Risk  Recent Flowsheet Documentation  Taken 7/26/2025 1400 by Tara Wallace RN  VTE Prevention/Management: (heparin gtt) other (see comments)  Taken 7/26/2025 0745 by Tara Wallace RN  VTE Prevention/Management: (heparin gtt) other (see comments)  Intervention: Prevent Infection  Recent Flowsheet Documentation  Taken 7/26/2025 1500 by Tara Wallace RN  Infection Prevention:   single patient room provided   visitors restricted/screened   rest/sleep promoted  Taken 7/26/2025 1100 by Tara Wallace RN  Infection Prevention:   single patient room provided   visitors restricted/screened   rest/sleep promoted  Taken 7/26/2025 0900 by Tara Wallace RN  Infection Prevention:   single patient room provided   visitors restricted/screened   rest/sleep promoted  Taken 7/26/2025 0700 by Tara Wallace RN  Infection Prevention:   single patient room provided   visitors restricted/screened   rest/sleep promoted  Goal: Optimal Comfort and Wellbeing  Outcome: Progressing  Intervention: Provide Person-Centered Care  Recent Flowsheet Documentation  Taken 7/26/2025 1400 by Tara Wallace RN  Trust Relationship/Rapport:   care explained   choices provided   questions answered   questions encouraged  Taken 7/26/2025 0745 by Tara Wallace RN  Trust Relationship/Rapport:   care explained   choices provided   questions encouraged   questions answered  Goal: Readiness for Transition of Care  Outcome: Progressing     Problem: Fall Injury Risk  Goal: Absence of Fall and Fall-Related Injury  Outcome: Progressing  Intervention: Identify and Manage Contributors  Recent Flowsheet Documentation  Taken 7/26/2025 1500 by  Tara Wallace RN  Medication Review/Management: medications reviewed  Taken 7/26/2025 1300 by Tara Wallace RN  Medication Review/Management: medications reviewed  Taken 7/26/2025 1100 by Tara Wallace RN  Medication Review/Management: medications reviewed  Taken 7/26/2025 0900 by Tara Wallace RN  Medication Review/Management: medications reviewed  Taken 7/26/2025 0700 by Tara Wallace RN  Medication Review/Management: medications reviewed  Intervention: Promote Injury-Free Environment  Recent Flowsheet Documentation  Taken 7/26/2025 1500 by Tara Wallace RN  Safety Promotion/Fall Prevention:   activity supervised   assistive device/personal items within reach   clutter free environment maintained   fall prevention program maintained   lighting adjusted   nonskid shoes/slippers when out of bed   room organization consistent   safety round/check completed  Taken 7/26/2025 1300 by Tara Wallace RN  Safety Promotion/Fall Prevention: safety round/check completed  Taken 7/26/2025 1100 by Tara Wallace RN  Safety Promotion/Fall Prevention:   activity supervised   assistive device/personal items within reach   clutter free environment maintained   fall prevention program maintained   lighting adjusted   nonskid shoes/slippers when out of bed   room organization consistent   safety round/check completed  Taken 7/26/2025 0900 by Tara Wallace RN  Safety Promotion/Fall Prevention:   activity supervised   assistive device/personal items within reach   clutter free environment maintained   fall prevention program maintained   lighting adjusted   nonskid shoes/slippers when out of bed   room organization consistent   safety round/check completed  Taken 7/26/2025 0700 by Tara Wallace RN  Safety Promotion/Fall Prevention:   activity supervised   assistive device/personal items within reach   clutter free environment maintained   fall prevention program maintained   lighting adjusted    nonskid shoes/slippers when out of bed   room organization consistent   safety round/check completed   Goal Outcome Evaluation:                                             ,DirectAddress_Unknown

## 2025-07-29 NOTE — ED ADULT NURSE NOTE - NSSEPSISSUSPECTED_ED_A_ED
[FreeTextEntry1] : [id number]  ALEJANDRO BARRETO  Feb 1 1939 [phone] y/o  *CAD-Calcium score 2952-MPI Feb '23 normal perfusion.   LCx 195 RCA 1230 *RUE prox subclavian stenosis causing 30 mmHg reduction of BP on R arm. *CVA April '22-acute L frontoparielt infarct-***s/p ILR *Carotid disease-mild-50-69% R ICA *HTN *PE, L leg DVT Nov '21 on eliquis, DM, COPD, Parkinson's,  here for followup. no chest pain,d yspnea, palpitations, syncope.   No